# Patient Record
Sex: FEMALE | URBAN - METROPOLITAN AREA
[De-identification: names, ages, dates, MRNs, and addresses within clinical notes are randomized per-mention and may not be internally consistent; named-entity substitution may affect disease eponyms.]

---

## 2020-02-24 ENCOUNTER — APPOINTMENT (RX ONLY)
Dept: URBAN - METROPOLITAN AREA CLINIC 91 | Facility: CLINIC | Age: 60
Setting detail: DERMATOLOGY
End: 2020-02-24

## 2020-02-24 DIAGNOSIS — L30.8 OTHER SPECIFIED DERMATITIS: ICD-10-CM

## 2020-02-24 PROCEDURE — ? STEROID WET DRESSINGS

## 2020-02-24 PROCEDURE — 99202 OFFICE O/P NEW SF 15 MIN: CPT

## 2020-02-24 PROCEDURE — ? COUNSELING

## 2020-02-24 PROCEDURE — ? PRESCRIPTION

## 2020-02-24 RX ORDER — CLOBETASOL PROPIONATE 0.5 MG/G
CREAM TOPICAL BID
Qty: 1 | Refills: 3 | Status: ERX | COMMUNITY
Start: 2020-02-24

## 2020-02-24 RX ADMIN — CLOBETASOL PROPIONATE: 0.5 CREAM TOPICAL at 00:00

## 2020-02-24 ASSESSMENT — LOCATION SIMPLE DESCRIPTION DERM
LOCATION SIMPLE: LEFT HAND
LOCATION SIMPLE: RIGHT HAND
LOCATION SIMPLE: LEFT SMALL FINGER
LOCATION SIMPLE: RIGHT THUMB
LOCATION SIMPLE: LEFT MIDDLE FINGER
LOCATION SIMPLE: LEFT INDEX FINGER
LOCATION SIMPLE: RIGHT SMALL FINGER
LOCATION SIMPLE: RIGHT RING FINGER
LOCATION SIMPLE: RIGHT MIDDLE FINGER
LOCATION SIMPLE: LEFT RING FINGER
LOCATION SIMPLE: RIGHT INDEX FINGER
LOCATION SIMPLE: LEFT THUMB

## 2020-02-24 ASSESSMENT — LOCATION DETAILED DESCRIPTION DERM
LOCATION DETAILED: LEFT RADIAL DORSAL HAND
LOCATION DETAILED: LEFT ULNAR DORSAL HAND
LOCATION DETAILED: RIGHT MID DORSAL SMALL FINGER
LOCATION DETAILED: RIGHT INDEX DISTAL INTERPHALANGEAL JOINT
LOCATION DETAILED: LEFT MID DORSAL INDEX FINGER
LOCATION DETAILED: LEFT DISTAL RADIAL THUMB
LOCATION DETAILED: LEFT RING FINGER DISTAL INTERPHALANGEAL JOINT
LOCATION DETAILED: RIGHT MID DORSAL RING FINGER
LOCATION DETAILED: RIGHT DISTAL RADIAL THUMB
LOCATION DETAILED: RIGHT RADIAL DORSAL HAND
LOCATION DETAILED: LEFT MID DORSAL MIDDLE FINGER
LOCATION DETAILED: LEFT SMALL FINGER DISTAL INTERPHALANGEAL JOINT
LOCATION DETAILED: RIGHT MIDDLE FINGER DISTAL INTERPHALANGEAL JOINT

## 2020-02-24 ASSESSMENT — LOCATION ZONE DERM
LOCATION ZONE: HAND
LOCATION ZONE: FINGER

## 2020-02-24 NOTE — PROCEDURE: STEROID WET DRESSINGS
Step 11: Apply more (lots more) Vanicream to hydrate the skin during the day.
Step 9: Stay in the dressings for at least 1 hour. Staying in the dressings longer than 2 hours is unnecessary.
Pharmacy Text: Your local pharmacy should have all of the items above except garbage bags and shower curtains.
Intro Text: You will be applying something to your skin at least twice a day until your rash is gone. You will start with an intensive program, and gradually decrease the intensity as outlined below. As your skin gets better, you move to the next stage, but if you have a setback you go back a stage. If you do this correctly and don't make progress after 5 days, please call.
Step 1: Wet Dressings- Body (Apply Creamx......): like butter to affected (red, raised, scaly, and itchy) areas of the skin, avoiding any skin folds.
Step 3: For skin unaffected by dermatitis apply Vanicream liberally.
Bathing: Bathe once daily with 1 oz Robathol bath oil in the tub (will make the tube very slippery). Wash only with Vanicream soap, shampoo and conditioner. Bathe daily.
Stage 4 Am: Steroid Creams (no wet dressings)
Step 10: Remove the dressings and rub any excess cream into the skin.
Step 5: While the items are soaking, place the garbage bags, plastic sheet or shower curtain on the bed or chair you will be using.
Stage 5 Am: Vanicream
Stage 1 Am: Steroid Wet Dressings
Step 8: After wrapping the affected areas cover the wet the items with additional plastic or the shower curtain and a blanket or cotton bathrobe to stay warm.
Step 6: Wring out the linens, pajamas, socks, or towels, but leave them damp.
Medication To Use For Wet Dressings On The Body: Clobetasol 0.05% cream
Step 7: Wrap affected body areas with the linens or pajamas.
Detail Level: Generalized
Medication To Use For Wet Dressings On The Face And Skin Creases: Hydrocortisone 2.5% cream
Step 4- Second Sentence: A washcloth with holes cut in it works fo the face and socks work well for the hands and feet.
Step 2: Wet Dressings- Face And Skin Folds (Apply Creamy......): like butter to the face (if needed), the groin and any affected skin folds.